# Patient Record
Sex: FEMALE | Race: WHITE | NOT HISPANIC OR LATINO | URBAN - METROPOLITAN AREA
[De-identification: names, ages, dates, MRNs, and addresses within clinical notes are randomized per-mention and may not be internally consistent; named-entity substitution may affect disease eponyms.]

---

## 2023-02-28 VITALS
RESPIRATION RATE: 16 BRPM | OXYGEN SATURATION: 98 % | SYSTOLIC BLOOD PRESSURE: 177 MMHG | WEIGHT: 161.6 LBS | HEART RATE: 77 BPM | TEMPERATURE: 98 F | DIASTOLIC BLOOD PRESSURE: 84 MMHG | HEIGHT: 67 IN

## 2023-02-28 RX ORDER — POVIDONE-IODINE 5 %
1 AEROSOL (ML) TOPICAL ONCE
Refills: 0 | Status: COMPLETED | OUTPATIENT
Start: 2023-03-01 | End: 2023-03-01

## 2023-02-28 NOTE — H&P ADULT - HISTORY OF PRESENT ILLNESS
68F with left knee pain x    Presents for elective left TKR.  68F with left knee pain x 40 years s/p left knee dislocation. Pain persists despite conservative measures including PT and injections. Denies numbness/tingling to BLE. Ambulates without assistance.   Denies history of a blood clot/seizures. Denies CP/SOB/N/V.     Presents for elective left TKR.

## 2023-02-28 NOTE — H&P ADULT - PROBLEM SELECTOR PLAN 1
Admit to Orthopedic Service for elective left TKR    Medically cleared and optimized for surgery by Dr. Perry

## 2023-02-28 NOTE — H&P ADULT - NSHPLABSRESULTS_GEN_ALL_CORE
Preop CBC, BMP, PT/INR, PTT within normal range and reviewed per medical clearance  Cr- .64  UA: WNL  Preop CXR within normal limits and reviewed per medical clearance   Preop EKG within normal limits and reviewed per medical clearance; SR@84bpm  3M: BRAVO  COVID: neg 2/25

## 2023-02-28 NOTE — H&P ADULT - NSHPPHYSICALEXAM_GEN_ALL_CORE
Gen: 68F NAD  MSK: Decreased left knee ROM secondary to pain      Rest of PE per MD clearance Gen: 68F NAD, Alert and oriented, NAD  MSK: Decreased left knee ROM secondary to pain  DP pulses palpable bilaterally. Skin wwp. Cap refill brisk.   SILT to BLE  EHL/FHL/TA/GS 5/5 bilaterally.     Rest of PE per MD clearance

## 2023-02-28 NOTE — ASU PREOP CHECKLIST - NS PREOP CHK CHLOROHEX WASH
Please let pt know her HgbA1c is creeping up slowly -- now 6.4. In addition to having her exercise and follow a low carb diet, I would like to see her again in 6 mos with labs prior.
Spoke with Umair Su to relay MD message below. She stays in [id number] [facility] and is unsure of travel plans as of yet but will arrange for follow up around June or so and work on making lifestyle changes noted below.
in ASU:

## 2023-03-01 ENCOUNTER — INPATIENT (INPATIENT)
Facility: HOSPITAL | Age: 68
LOS: 1 days | Discharge: HOME | DRG: 470 | End: 2023-03-03
Attending: ORTHOPAEDIC SURGERY | Admitting: ORTHOPAEDIC SURGERY
Payer: MEDICARE

## 2023-03-01 DIAGNOSIS — M17.12 UNILATERAL PRIMARY OSTEOARTHRITIS, LEFT KNEE: ICD-10-CM

## 2023-03-01 DIAGNOSIS — E78.5 HYPERLIPIDEMIA, UNSPECIFIED: ICD-10-CM

## 2023-03-01 PROCEDURE — 73560 X-RAY EXAM OF KNEE 1 OR 2: CPT | Mod: 26,LT

## 2023-03-01 DEVICE — CELLERATE SURGICAL POWDER RX 5GM: Type: IMPLANTABLE DEVICE | Site: LEFT | Status: FUNCTIONAL

## 2023-03-01 DEVICE — PATELLA VE 29MM: Type: IMPLANTABLE DEVICE | Site: LEFT | Status: FUNCTIONAL

## 2023-03-01 DEVICE — SURF ART PERSONA LT 6-9 CD 10MM: Type: IMPLANTABLE DEVICE | Site: LEFT | Status: FUNCTIONAL

## 2023-03-01 DEVICE — ZIMMER/NEXGEN SMOOTH PIN 3.2X75MM: Type: IMPLANTABLE DEVICE | Site: LEFT | Status: FUNCTIONAL

## 2023-03-01 DEVICE — TIB PSN NP STM 5 DEG SZ DL: Type: IMPLANTABLE DEVICE | Site: LEFT | Status: FUNCTIONAL

## 2023-03-01 DEVICE — ZIMMER/NEXGEN HEX HEAD SCREW 3.5MM: Type: IMPLANTABLE DEVICE | Site: LEFT | Status: FUNCTIONAL

## 2023-03-01 DEVICE — ZIMMER FEMALE HEX SCREW MAGNETIC 2.5MM X 25MM: Type: IMPLANTABLE DEVICE | Site: LEFT | Status: FUNCTIONAL

## 2023-03-01 DEVICE — IMPLANTABLE DEVICE: Type: IMPLANTABLE DEVICE | Site: LEFT | Status: FUNCTIONAL

## 2023-03-01 DEVICE — STEM EXT PERSONA 14MM PLUS 30M: Type: IMPLANTABLE DEVICE | Site: LEFT | Status: FUNCTIONAL

## 2023-03-01 DEVICE — FEM PERSONA PS CMT CCR STD SZ6 L: Type: IMPLANTABLE DEVICE | Site: LEFT | Status: FUNCTIONAL

## 2023-03-01 RX ORDER — OXYCODONE HYDROCHLORIDE 5 MG/1
5 TABLET ORAL EVERY 4 HOURS
Refills: 0 | Status: DISCONTINUED | OUTPATIENT
Start: 2023-03-01 | End: 2023-03-02

## 2023-03-01 RX ORDER — POLYETHYLENE GLYCOL 3350 17 G/17G
17 POWDER, FOR SOLUTION ORAL AT BEDTIME
Refills: 0 | Status: DISCONTINUED | OUTPATIENT
Start: 2023-03-01 | End: 2023-03-03

## 2023-03-01 RX ORDER — ONDANSETRON 8 MG/1
4 TABLET, FILM COATED ORAL EVERY 6 HOURS
Refills: 0 | Status: DISCONTINUED | OUTPATIENT
Start: 2023-03-01 | End: 2023-03-03

## 2023-03-01 RX ORDER — SENNA PLUS 8.6 MG/1
2 TABLET ORAL AT BEDTIME
Refills: 0 | Status: DISCONTINUED | OUTPATIENT
Start: 2023-03-01 | End: 2023-03-03

## 2023-03-01 RX ORDER — ASPIRIN/CALCIUM CARB/MAGNESIUM 324 MG
325 TABLET ORAL
Refills: 0 | Status: DISCONTINUED | OUTPATIENT
Start: 2023-03-01 | End: 2023-03-03

## 2023-03-01 RX ORDER — PANTOPRAZOLE SODIUM 20 MG/1
40 TABLET, DELAYED RELEASE ORAL
Refills: 0 | Status: DISCONTINUED | OUTPATIENT
Start: 2023-03-01 | End: 2023-03-03

## 2023-03-01 RX ORDER — ACETAMINOPHEN 500 MG
975 TABLET ORAL EVERY 8 HOURS
Refills: 0 | Status: DISCONTINUED | OUTPATIENT
Start: 2023-03-01 | End: 2023-03-03

## 2023-03-01 RX ORDER — CHLORHEXIDINE GLUCONATE 213 G/1000ML
1 SOLUTION TOPICAL ONCE
Refills: 0 | Status: COMPLETED | OUTPATIENT
Start: 2023-03-01 | End: 2023-03-01

## 2023-03-01 RX ORDER — SODIUM CHLORIDE 9 MG/ML
1000 INJECTION, SOLUTION INTRAVENOUS
Refills: 0 | Status: DISCONTINUED | OUTPATIENT
Start: 2023-03-01 | End: 2023-03-02

## 2023-03-01 RX ORDER — MORPHINE SULFATE 50 MG/1
2 CAPSULE, EXTENDED RELEASE ORAL EVERY 4 HOURS
Refills: 0 | Status: DISCONTINUED | OUTPATIENT
Start: 2023-03-01 | End: 2023-03-03

## 2023-03-01 RX ORDER — MAGNESIUM HYDROXIDE 400 MG/1
30 TABLET, CHEWABLE ORAL DAILY
Refills: 0 | Status: DISCONTINUED | OUTPATIENT
Start: 2023-03-01 | End: 2023-03-03

## 2023-03-01 RX ORDER — SIMVASTATIN 20 MG/1
20 TABLET, FILM COATED ORAL AT BEDTIME
Refills: 0 | Status: DISCONTINUED | OUTPATIENT
Start: 2023-03-01 | End: 2023-03-03

## 2023-03-01 RX ORDER — HYDROMORPHONE HYDROCHLORIDE 2 MG/ML
0.5 INJECTION INTRAMUSCULAR; INTRAVENOUS; SUBCUTANEOUS
Refills: 0 | Status: DISCONTINUED | OUTPATIENT
Start: 2023-03-01 | End: 2023-03-03

## 2023-03-01 RX ORDER — SIMVASTATIN 20 MG/1
1 TABLET, FILM COATED ORAL
Qty: 0 | Refills: 0 | DISCHARGE

## 2023-03-01 RX ORDER — OXYCODONE HYDROCHLORIDE 5 MG/1
10 TABLET ORAL EVERY 4 HOURS
Refills: 0 | Status: DISCONTINUED | OUTPATIENT
Start: 2023-03-01 | End: 2023-03-02

## 2023-03-01 RX ORDER — CEFAZOLIN SODIUM 1 G
2000 VIAL (EA) INJECTION EVERY 8 HOURS
Refills: 0 | Status: COMPLETED | OUTPATIENT
Start: 2023-03-01 | End: 2023-03-02

## 2023-03-01 RX ORDER — CELECOXIB 200 MG/1
200 CAPSULE ORAL EVERY 12 HOURS
Refills: 0 | Status: DISCONTINUED | OUTPATIENT
Start: 2023-03-02 | End: 2023-03-03

## 2023-03-01 RX ADMIN — Medication 1 APPLICATION(S): at 08:42

## 2023-03-01 RX ADMIN — ONDANSETRON 4 MILLIGRAM(S): 8 TABLET, FILM COATED ORAL at 18:03

## 2023-03-01 RX ADMIN — Medication 100 MILLIGRAM(S): at 18:49

## 2023-03-01 RX ADMIN — SENNA PLUS 2 TABLET(S): 8.6 TABLET ORAL at 23:19

## 2023-03-01 RX ADMIN — POLYETHYLENE GLYCOL 3350 17 GRAM(S): 17 POWDER, FOR SOLUTION ORAL at 23:19

## 2023-03-01 RX ADMIN — CHLORHEXIDINE GLUCONATE 1 APPLICATION(S): 213 SOLUTION TOPICAL at 08:42

## 2023-03-01 RX ADMIN — SIMVASTATIN 20 MILLIGRAM(S): 20 TABLET, FILM COATED ORAL at 23:20

## 2023-03-01 RX ADMIN — Medication 325 MILLIGRAM(S): at 19:55

## 2023-03-01 RX ADMIN — Medication 975 MILLIGRAM(S): at 23:20

## 2023-03-01 NOTE — PHYSICAL THERAPY INITIAL EVALUATION ADULT - GAIT DEVIATIONS NOTED, PT EVAL
Pt slightly unsteady w/ dec weight shifting abilities. Dec step length and nivia. Adequate BUE strength to maneuver RW w/o difficulties. Complained of lightheadedness w/ amb. Vitals WNL post amb. No acute distress noted./decreased nivia/increased time in double stance/decreased velocity of limb motion/decreased step length/decreased stride length/decreased weight-shifting ability

## 2023-03-01 NOTE — PHYSICAL THERAPY INITIAL EVALUATION ADULT - PERTINENT HX OF CURRENT PROBLEM, REHAB EVAL
68F with left knee pain x 40 years s/p left knee dislocation. Pain persists despite conservative measures including PT and injections. Denies numbness/tingling to BLE. Ambulates without assistance.   Denies history of a blood clot/seizures. Denies CP/SOB/N/V.     Presents for elective left TKR.

## 2023-03-01 NOTE — PHYSICAL THERAPY INITIAL EVALUATION ADULT - SIT-TO-STAND BALANCE
[Non-Contributory] : Non-contributory [Duration: ___ wks] : duration: [unfilled] weeks [] :  [___ lbs.] : [unfilled] lbs [Normal?] : normal delivery [___ oz.] : [unfilled] oz. [Was child in NICU?] : Child was not in NICU good minus

## 2023-03-01 NOTE — PHYSICAL THERAPY INITIAL EVALUATION ADULT - ADDITIONAL COMMENTS
Pt currently resides in elevator apt w/ , 8 PIER[address]. Primarily amb indep w/o AD. Denies falls within past 6 months. Indep w/ all ADL and iADL tasks prior to sx.

## 2023-03-01 NOTE — BRIEF OPERATIVE NOTE - OPERATION/FINDINGS
See dictation See dictation  Michelle Persona Size 6 PS Femur  Size D Tibia + 14/30 Stem  Size 10 Poly  Size 29 Patella

## 2023-03-01 NOTE — PHYSICAL THERAPY INITIAL EVALUATION ADULT - MODALITIES TREATMENT COMMENTS
show
Written HEP provided - supine quad sets, supine heel slides, supine ankle pumps, supine glute sets.

## 2023-03-02 DIAGNOSIS — Z98.890 OTHER SPECIFIED POSTPROCEDURAL STATES: ICD-10-CM

## 2023-03-02 LAB
ANION GAP SERPL CALC-SCNC: 7 MMOL/L — SIGNIFICANT CHANGE UP (ref 5–17)
BUN SERPL-MCNC: 9 MG/DL — SIGNIFICANT CHANGE UP (ref 7–23)
CALCIUM SERPL-MCNC: 9.5 MG/DL — SIGNIFICANT CHANGE UP (ref 8.4–10.5)
CHLORIDE SERPL-SCNC: 100 MMOL/L — SIGNIFICANT CHANGE UP (ref 96–108)
CO2 SERPL-SCNC: 26 MMOL/L — SIGNIFICANT CHANGE UP (ref 22–31)
CREAT SERPL-MCNC: 0.57 MG/DL — SIGNIFICANT CHANGE UP (ref 0.5–1.3)
EGFR: 98 ML/MIN/1.73M2 — SIGNIFICANT CHANGE UP
GLUCOSE SERPL-MCNC: 122 MG/DL — HIGH (ref 70–99)
HCT VFR BLD CALC: 31.6 % — LOW (ref 34.5–45)
HCV AB S/CO SERPL IA: 0.04 S/CO — SIGNIFICANT CHANGE UP
HCV AB SERPL-IMP: SIGNIFICANT CHANGE UP
HGB BLD-MCNC: 10.2 G/DL — LOW (ref 11.5–15.5)
MCHC RBC-ENTMCNC: 29.7 PG — SIGNIFICANT CHANGE UP (ref 27–34)
MCHC RBC-ENTMCNC: 32.3 GM/DL — SIGNIFICANT CHANGE UP (ref 32–36)
MCV RBC AUTO: 92.1 FL — SIGNIFICANT CHANGE UP (ref 80–100)
NRBC # BLD: 0 /100 WBCS — SIGNIFICANT CHANGE UP (ref 0–0)
PLATELET # BLD AUTO: 208 K/UL — SIGNIFICANT CHANGE UP (ref 150–400)
POTASSIUM SERPL-MCNC: 4.1 MMOL/L — SIGNIFICANT CHANGE UP (ref 3.5–5.3)
POTASSIUM SERPL-SCNC: 4.1 MMOL/L — SIGNIFICANT CHANGE UP (ref 3.5–5.3)
RBC # BLD: 3.43 M/UL — LOW (ref 3.8–5.2)
RBC # FLD: 12.8 % — SIGNIFICANT CHANGE UP (ref 10.3–14.5)
SODIUM SERPL-SCNC: 133 MMOL/L — LOW (ref 135–145)
WBC # BLD: 10.96 K/UL — HIGH (ref 3.8–10.5)
WBC # FLD AUTO: 10.96 K/UL — HIGH (ref 3.8–10.5)

## 2023-03-02 PROCEDURE — 99232 SBSQ HOSP IP/OBS MODERATE 35: CPT

## 2023-03-02 RX ORDER — OXYCODONE HYDROCHLORIDE 5 MG/1
5 TABLET ORAL EVERY 4 HOURS
Refills: 0 | Status: DISCONTINUED | OUTPATIENT
Start: 2023-03-02 | End: 2023-03-02

## 2023-03-02 RX ORDER — OXYCODONE HYDROCHLORIDE 5 MG/1
2.5 TABLET ORAL EVERY 4 HOURS
Refills: 0 | Status: DISCONTINUED | OUTPATIENT
Start: 2023-03-02 | End: 2023-03-02

## 2023-03-02 RX ORDER — OXYCODONE HYDROCHLORIDE 5 MG/1
10 TABLET ORAL EVERY 4 HOURS
Refills: 0 | Status: DISCONTINUED | OUTPATIENT
Start: 2023-03-02 | End: 2023-03-03

## 2023-03-02 RX ORDER — OXYCODONE HYDROCHLORIDE 5 MG/1
5 TABLET ORAL EVERY 4 HOURS
Refills: 0 | Status: DISCONTINUED | OUTPATIENT
Start: 2023-03-02 | End: 2023-03-03

## 2023-03-02 RX ADMIN — MORPHINE SULFATE 2 MILLIGRAM(S): 50 CAPSULE, EXTENDED RELEASE ORAL at 21:59

## 2023-03-02 RX ADMIN — Medication 325 MILLIGRAM(S): at 06:26

## 2023-03-02 RX ADMIN — PANTOPRAZOLE SODIUM 40 MILLIGRAM(S): 20 TABLET, DELAYED RELEASE ORAL at 06:26

## 2023-03-02 RX ADMIN — SENNA PLUS 2 TABLET(S): 8.6 TABLET ORAL at 21:59

## 2023-03-02 RX ADMIN — MORPHINE SULFATE 2 MILLIGRAM(S): 50 CAPSULE, EXTENDED RELEASE ORAL at 17:20

## 2023-03-02 RX ADMIN — Medication 100 MILLIGRAM(S): at 03:10

## 2023-03-02 RX ADMIN — Medication 975 MILLIGRAM(S): at 07:26

## 2023-03-02 RX ADMIN — OXYCODONE HYDROCHLORIDE 10 MILLIGRAM(S): 5 TABLET ORAL at 20:36

## 2023-03-02 RX ADMIN — CELECOXIB 200 MILLIGRAM(S): 200 CAPSULE ORAL at 17:20

## 2023-03-02 RX ADMIN — OXYCODONE HYDROCHLORIDE 10 MILLIGRAM(S): 5 TABLET ORAL at 16:11

## 2023-03-02 RX ADMIN — POLYETHYLENE GLYCOL 3350 17 GRAM(S): 17 POWDER, FOR SOLUTION ORAL at 21:59

## 2023-03-02 RX ADMIN — CELECOXIB 200 MILLIGRAM(S): 200 CAPSULE ORAL at 18:20

## 2023-03-02 RX ADMIN — Medication 975 MILLIGRAM(S): at 14:12

## 2023-03-02 RX ADMIN — CELECOXIB 200 MILLIGRAM(S): 200 CAPSULE ORAL at 07:26

## 2023-03-02 RX ADMIN — Medication 975 MILLIGRAM(S): at 22:59

## 2023-03-02 RX ADMIN — SIMVASTATIN 20 MILLIGRAM(S): 20 TABLET, FILM COATED ORAL at 21:59

## 2023-03-02 RX ADMIN — Medication 975 MILLIGRAM(S): at 06:26

## 2023-03-02 RX ADMIN — CELECOXIB 200 MILLIGRAM(S): 200 CAPSULE ORAL at 06:26

## 2023-03-02 RX ADMIN — Medication 975 MILLIGRAM(S): at 21:59

## 2023-03-02 RX ADMIN — MORPHINE SULFATE 2 MILLIGRAM(S): 50 CAPSULE, EXTENDED RELEASE ORAL at 22:59

## 2023-03-02 RX ADMIN — OXYCODONE HYDROCHLORIDE 5 MILLIGRAM(S): 5 TABLET ORAL at 12:43

## 2023-03-02 RX ADMIN — OXYCODONE HYDROCHLORIDE 10 MILLIGRAM(S): 5 TABLET ORAL at 21:36

## 2023-03-02 RX ADMIN — Medication 975 MILLIGRAM(S): at 13:12

## 2023-03-02 RX ADMIN — OXYCODONE HYDROCHLORIDE 5 MILLIGRAM(S): 5 TABLET ORAL at 11:43

## 2023-03-02 RX ADMIN — Medication 975 MILLIGRAM(S): at 00:02

## 2023-03-02 RX ADMIN — Medication 325 MILLIGRAM(S): at 17:21

## 2023-03-02 NOTE — DISCHARGE NOTE PROVIDER - HOSPITAL COURSE
Admitted: 3/1/23  Surgery: 3/1/23  Ara-op Antibiotics: Ancef  Pain control  DVT prophylaxis: SCDs, ASA 325mg BID  OOB/Physical Therapy  Consultants:  Inpatient events:   Admitted: 3/1/23  Surgery: 3/1/23  Ara-op Antibiotics: Ancef  Pain control  DVT prophylaxis: SCDs, ASA 325mg BID  OOB/Physical Therapy  Consultants: n/a  Inpatient events: n/a

## 2023-03-02 NOTE — DISCHARGE NOTE PROVIDER - NSDCFUADDINST_GEN_ALL_CORE_FT
ACTIVITY:  - Weightbear as tolerated with assistive device. No strenuous activity, heavy lifting, driving or returning to work until cleared by MD.  - You may experience postoperative swelling on the operative extremity. You may ice the surgery site for 20 minute intervals every hour.  - If you have had a knee replacement, do not place pillows or bolsters underneath the back of the knee. You may elevate the leg at the level of the ankle/heel.  _________  DRESSING: Aquacel (brown waxy dressing over knee incision); Gauze/teg (clear covering over gauze, below Aquacel)  - You may shower, your dressing is water-resistant. Do not soak in bathtubs. Remove dressing after postop day 10, then leave incision open to air.  - Keep your incision clean and dry. Do not pick at your incision. Do not apply creams, ointments or oils to your incision until cleared by your surgeon. Do not soak your incision in sitting water (ie tubs, pools, lakes, etc.) until cleared by your surgeon. You may let clean, running water fall over your incision.  - For any questions or concerns regarding incision or dressing care, call your surgeon's office.  _________  MEDICATION/ANTICOAGULATION:  - You have been prescribed Aspirin 325mg twice daily x 30 days as a preventative to help prevent postoperative blood clots. Please take this medication as prescribed.   - You have been prescribed medications for pain:    - Tylenol for mild to moderate pain. Do not exceed 3,000mg daily.    - Narcotic pain medication. For more severe pain, take Tylenol with the addition of narcotic pain medication. Take this medication as prescribed. This medication may cause drowsiness or dizziness. Do not operate machinery. This medication may cause constipation.  - Try to have regular bowel movements. Take stool softener or laxative if necessary. You may wish to take Miralax (purchased over the counter) daily until you have regular bowel movements.   - GI protection. Please take Pantoprazole once a day, before breakfast, until no longer taking Aspirin, blood thinner, or anti-inflammatory. This will help protect your stomach.  - For any additional medications, follow instructions on the bottle.   - If you have a pain management physician, please follow-up with them postoperatively.   - If you experience any negative side effects of your medications, please call your surgeon's office to discuss.  _________  Follow-up:  - Call to schedule an appt with Dr. Aguilar within 2 weeks of surgery for follow up. If you have staples or sutures they will be removed in office.  - Please follow-up with your primary care physician or any other specialist you see postoperatively, if needed.   - Contact your doctor if you experience: fever greater than 101.5, chills, chest pain, difficulty breathing, redness or excessive drainage around the incision, other concerns.   ACTIVITY:  - Weightbear as tolerated with assistive device. No strenuous activity, heavy lifting, driving or returning to work until cleared by MD.  - You may experience postoperative swelling on the operative extremity. You may ice the surgery site for 20 minute intervals every hour.  - If you have had a knee replacement, do not place pillows or bolsters underneath the back of the knee. You may elevate the leg at the level of the ankle/heel.  _________  DRESSING: Aquacel (brown waxy dressing over knee incision); Gauze/teg (clear covering over gauze, below Aquacel)  - You may shower, your dressing is water-resistant. Do not soak in bathtubs. Remove dressing after postop day 10, then leave incision open to air.  - Keep your incision clean and dry. Do not pick at your incision. Do not apply creams, ointments or oils to your incision until cleared by your surgeon. Do not soak your incision in sitting water (ie tubs, pools, lakes, etc.) until cleared by your surgeon. You may let clean, running water fall over your incision.  - For any questions or concerns regarding incision or dressing care, call your surgeon's office.  - Please wear the compression stockings until you follow up with your surgeon, you may remove them to shower.  _________  MEDICATION/ANTICOAGULATION:  - You have been prescribed Aspirin 325mg twice daily x 30 days as a preventative to help prevent postoperative blood clots. Please take this medication as prescribed.   - You have been prescribed medications for pain:    - Tylenol for mild to moderate pain. Do not exceed 3,000mg daily.    - Narcotic pain medication. For more severe pain, take Tylenol with the addition of narcotic pain medication. Take this medication as prescribed. This medication may cause drowsiness or dizziness. Do not operate machinery. This medication may cause constipation.  - Try to have regular bowel movements. Take stool softener or laxative if necessary. You may wish to take Miralax (purchased over the counter) daily until you have regular bowel movements.   - GI protection. Please take Pantoprazole once a day, before breakfast, until no longer taking Aspirin, blood thinner, or anti-inflammatory. This will help protect your stomach.  - For any additional medications, follow instructions on the bottle.   - If you have a pain management physician, please follow-up with them postoperatively.   - If you experience any negative side effects of your medications, please call your surgeon's office to discuss.  _________  Follow-up:  - Call to schedule an appt with Dr. Aguilar within 2 weeks of surgery for follow up. If you have staples or sutures they will be removed in office.  - Please follow-up with your primary care physician or any other specialist you see postoperatively, if needed.   - Your sodium was low at 132 this morning, you have been given a prescription to recheck your sodium outpatient, please take this to a lab within 1 week of discharge and follow up with your PCP to review results.  - Encourage you to eat regular meals and multiple snacks throughout the day   - Contact your doctor if you experience: fever greater than 101.5, chills, chest pain, difficulty breathing, redness or excessive drainage around the incision, other concerns.

## 2023-03-02 NOTE — PROGRESS NOTE ADULT - ASSESSMENT
A/P: 68yFemale s/p L TKA on 03/02  - Stable  - Pain/Nausea Control adequate  - Home meds  - AM labs pending 03/02  - DVT ppx:  BID  - WBS: WBAT LLE  - PT: rec'd HPT  - Dispo: pending PT clearance -- likely 03/02 vs 03/03      Ortho Pager [phone]

## 2023-03-02 NOTE — DISCHARGE NOTE PROVIDER - NSDCACTIVITY_GEN_ALL_CORE
Showering allowed/Stairs allowed/Walking - Indoors allowed/No heavy lifting/straining/Follow Instructions Provided by your Surgical Team

## 2023-03-02 NOTE — DISCHARGE NOTE PROVIDER - CARE PROVIDER_API CALL
Kan Aguilar)  Orthopaedic Surgery  [address] [place] [address]  Phone: [phone]  Fax: [phone]  Follow Up Time: 2 weeks

## 2023-03-02 NOTE — DISCHARGE NOTE PROVIDER - NSDCMRMEDTOKEN_GEN_ALL_CORE_FT
simvastatin 20 mg oral tablet: 1 tab(s) orally once a day (at bedtime)   acetaminophen 325 mg oral tablet: 3 tab(s) orally every 8 hours  aspirin 325 mg oral tablet: 1 tab(s) orally 2 times a day  oxyCODONE 5 mg oral tablet: 1-2 tab(s) orally every 4 hours, As Needed -for moderate to severe pain MDD:6   simvastatin 20 mg oral tablet: 1 tab(s) orally once a day (at bedtime)   acetaminophen 325 mg oral tablet: 3 tab(s) orally every 8 hours  aspirin 325 mg oral tablet: 1 tab(s) orally 2 times a day  BMP: BMP    Please fax to Dr. Perry- Fax- [phone]  pantoprazole 40 mg oral delayed release tablet: 1 tab(s) orally once a day (before a meal)  polyethylene glycol 3350 oral powder for reconstitution: 17 gram(s) orally once a day (at bedtime)  senna leaf extract oral tablet: 2 tab(s) orally once a day (at bedtime)  simvastatin 20 mg oral tablet: 1 tab(s) orally once a day (at bedtime)

## 2023-03-03 VITALS
DIASTOLIC BLOOD PRESSURE: 65 MMHG | HEART RATE: 72 BPM | RESPIRATION RATE: 16 BRPM | TEMPERATURE: 98 F | SYSTOLIC BLOOD PRESSURE: 103 MMHG | OXYGEN SATURATION: 95 %

## 2023-03-03 DIAGNOSIS — D62 ACUTE POSTHEMORRHAGIC ANEMIA: ICD-10-CM

## 2023-03-03 DIAGNOSIS — E87.1 HYPO-OSMOLALITY AND HYPONATREMIA: ICD-10-CM

## 2023-03-03 LAB
ANION GAP SERPL CALC-SCNC: 7 MMOL/L — SIGNIFICANT CHANGE UP (ref 5–17)
BUN SERPL-MCNC: 11 MG/DL — SIGNIFICANT CHANGE UP (ref 7–23)
CALCIUM SERPL-MCNC: 9.1 MG/DL — SIGNIFICANT CHANGE UP (ref 8.4–10.5)
CHLORIDE SERPL-SCNC: 96 MMOL/L — SIGNIFICANT CHANGE UP (ref 96–108)
CO2 SERPL-SCNC: 29 MMOL/L — SIGNIFICANT CHANGE UP (ref 22–31)
CREAT SERPL-MCNC: 0.65 MG/DL — SIGNIFICANT CHANGE UP (ref 0.5–1.3)
EGFR: 95 ML/MIN/1.73M2 — SIGNIFICANT CHANGE UP
GLUCOSE SERPL-MCNC: 106 MG/DL — HIGH (ref 70–99)
HCT VFR BLD CALC: 30.5 % — LOW (ref 34.5–45)
HGB BLD-MCNC: 10 G/DL — LOW (ref 11.5–15.5)
MCHC RBC-ENTMCNC: 29.9 PG — SIGNIFICANT CHANGE UP (ref 27–34)
MCHC RBC-ENTMCNC: 32.8 GM/DL — SIGNIFICANT CHANGE UP (ref 32–36)
MCV RBC AUTO: 91 FL — SIGNIFICANT CHANGE UP (ref 80–100)
NRBC # BLD: 0 /100 WBCS — SIGNIFICANT CHANGE UP (ref 0–0)
PLATELET # BLD AUTO: 193 K/UL — SIGNIFICANT CHANGE UP (ref 150–400)
POTASSIUM SERPL-MCNC: 4 MMOL/L — SIGNIFICANT CHANGE UP (ref 3.5–5.3)
POTASSIUM SERPL-SCNC: 4 MMOL/L — SIGNIFICANT CHANGE UP (ref 3.5–5.3)
RBC # BLD: 3.35 M/UL — LOW (ref 3.8–5.2)
RBC # FLD: 13.2 % — SIGNIFICANT CHANGE UP (ref 10.3–14.5)
SODIUM SERPL-SCNC: 132 MMOL/L — LOW (ref 135–145)
WBC # BLD: 7.42 K/UL — SIGNIFICANT CHANGE UP (ref 3.8–10.5)
WBC # FLD AUTO: 7.42 K/UL — SIGNIFICANT CHANGE UP (ref 3.8–10.5)

## 2023-03-03 PROCEDURE — 97161 PT EVAL LOW COMPLEX 20 MIN: CPT

## 2023-03-03 PROCEDURE — 85027 COMPLETE CBC AUTOMATED: CPT

## 2023-03-03 PROCEDURE — 99231 SBSQ HOSP IP/OBS SF/LOW 25: CPT

## 2023-03-03 PROCEDURE — 86803 HEPATITIS C AB TEST: CPT

## 2023-03-03 PROCEDURE — C1889: CPT

## 2023-03-03 PROCEDURE — 73560 X-RAY EXAM OF KNEE 1 OR 2: CPT

## 2023-03-03 PROCEDURE — 97116 GAIT TRAINING THERAPY: CPT

## 2023-03-03 PROCEDURE — C1713: CPT

## 2023-03-03 PROCEDURE — 80048 BASIC METABOLIC PNL TOTAL CA: CPT

## 2023-03-03 PROCEDURE — C1776: CPT

## 2023-03-03 PROCEDURE — 36415 COLL VENOUS BLD VENIPUNCTURE: CPT

## 2023-03-03 RX ORDER — OXYCODONE HYDROCHLORIDE 5 MG/1
1 TABLET ORAL
Qty: 0 | Refills: 0 | DISCHARGE
Start: 2023-03-03

## 2023-03-03 RX ORDER — PANTOPRAZOLE SODIUM 20 MG/1
1 TABLET, DELAYED RELEASE ORAL
Qty: 30 | Refills: 0
Start: 2023-03-03 | End: 2023-04-01

## 2023-03-03 RX ORDER — POLYETHYLENE GLYCOL 3350 17 G/17G
17 POWDER, FOR SOLUTION ORAL
Qty: 0 | Refills: 0 | DISCHARGE
Start: 2023-03-03

## 2023-03-03 RX ORDER — ASPIRIN/CALCIUM CARB/MAGNESIUM 324 MG
1 TABLET ORAL
Qty: 60 | Refills: 0
Start: 2023-03-03 | End: 2023-04-01

## 2023-03-03 RX ORDER — OXYCODONE HYDROCHLORIDE 5 MG/1
1 TABLET ORAL
Qty: 30 | Refills: 0
Start: 2023-03-03 | End: 2023-03-07

## 2023-03-03 RX ORDER — SENNA PLUS 8.6 MG/1
2 TABLET ORAL
Qty: 0 | Refills: 0 | DISCHARGE
Start: 2023-03-03

## 2023-03-03 RX ORDER — ACETAMINOPHEN 500 MG
3 TABLET ORAL
Qty: 0 | Refills: 0 | DISCHARGE
Start: 2023-03-03

## 2023-03-03 RX ADMIN — Medication 975 MILLIGRAM(S): at 05:58

## 2023-03-03 RX ADMIN — Medication 975 MILLIGRAM(S): at 13:47

## 2023-03-03 RX ADMIN — CELECOXIB 200 MILLIGRAM(S): 200 CAPSULE ORAL at 06:59

## 2023-03-03 RX ADMIN — OXYCODONE HYDROCHLORIDE 10 MILLIGRAM(S): 5 TABLET ORAL at 02:34

## 2023-03-03 RX ADMIN — Medication 975 MILLIGRAM(S): at 06:59

## 2023-03-03 RX ADMIN — OXYCODONE HYDROCHLORIDE 10 MILLIGRAM(S): 5 TABLET ORAL at 15:55

## 2023-03-03 RX ADMIN — OXYCODONE HYDROCHLORIDE 10 MILLIGRAM(S): 5 TABLET ORAL at 08:09

## 2023-03-03 RX ADMIN — Medication 325 MILLIGRAM(S): at 05:59

## 2023-03-03 RX ADMIN — OXYCODONE HYDROCHLORIDE 10 MILLIGRAM(S): 5 TABLET ORAL at 07:09

## 2023-03-03 RX ADMIN — OXYCODONE HYDROCHLORIDE 10 MILLIGRAM(S): 5 TABLET ORAL at 03:34

## 2023-03-03 RX ADMIN — CELECOXIB 200 MILLIGRAM(S): 200 CAPSULE ORAL at 05:58

## 2023-03-03 RX ADMIN — Medication 975 MILLIGRAM(S): at 14:04

## 2023-03-03 RX ADMIN — PANTOPRAZOLE SODIUM 40 MILLIGRAM(S): 20 TABLET, DELAYED RELEASE ORAL at 05:58

## 2023-03-03 RX ADMIN — OXYCODONE HYDROCHLORIDE 10 MILLIGRAM(S): 5 TABLET ORAL at 14:55

## 2023-03-03 NOTE — PROGRESS NOTE ADULT - ASSESSMENT
A/P: 68yFemale s/p L TKA on 03/02  - Stable  - Pain/Nausea Control adequate  - Home meds  - AM labs pending 03/03 -- Stable 03/03  - DVT ppx:  BID  - WBS: WBAT LLE  - PT: rec'd HPT  - Dispo: pending PT clearance -- likely 03/03      Ortho Pager [phone]

## 2023-03-03 NOTE — DISCHARGE NOTE NURSING/CASE MANAGEMENT/SOCIAL WORK - PATIENT PORTAL LINK FT
You can access the FollowMyHealth Patient Portal offered by [facility] by registering at the following website: [email or web address]. By joining [facility]’s FollowMyHealth portal, you will also be able to view your health information using other applications (apps) compatible with our system.

## 2023-03-03 NOTE — PROGRESS NOTE ADULT - SUBJECTIVE AND OBJECTIVE BOX
Ortho Note    Pt comfortable without complaints, pain controlled at time of encounter  Denies CP, SOB, N/V, numbness/tingling   One instance of poorly controlled pain 03/02 which resolved w morphine x1  Otherwise doing well    Vital Signs Last 24 Hrs  T(C): 36.7 (03 Mar 2023 05:07), Max: 36.8 (02 Mar 2023 09:01)  T(F): 98 (03 Mar 2023 05:07), Max: 98.3 (02 Mar 2023 09:01)  HR: 72 (03 Mar 2023 05:07) (72 - 83)  BP: 103/65 (03 Mar 2023 05:07) (94/57 - 123/76)  BP(mean): 69 (02 Mar 2023 09:01) (69 - 69)  RR: 16 (03 Mar 2023 05:07) (16 - 18)  SpO2: 95% (03 Mar 2023 05:07) (95% - 97%)    Parameters below as of 03 Mar 2023 05:07  Patient On (Oxygen Delivery Method): room air      VSS  General: A&Ox3, NAD  LLE: Aquacell DSG C/D/I  Pulses: Foot WWP; DP pulse 2+; Cap refill < 2 sec  Sensation: SILT distally and symmetric to contralateral extremity  Motor: TA/EHL/FHL/GS 5/5 and symmetric to contralateral extremity      Labs:                        10.2   10.96 )-----------( 208      ( 02 Mar 2023 05:30 )             31.6       03-02    133<L>  |  100  |  9   ----------------------------<  122<H>  4.1   |  26  |  0.57    Ca    9.5      02 Mar 2023 05:30                                            
Ortho Note    Pt comfortable without complaints, pain controlled. Reports improved "foggy" feeling from postop check yesterday.  Denies CP, SOB, N/V, new numbness/tingling. Denies dizziness or lightheaded feeling.  Tolerating PO intake, voiding without complication.     Vital Signs Last 24 Hrs  T(C): 36.8 (03-02-23 @ 09:01), Max: 36.8 (03-02-23 @ 09:01)  T(F): 98.3 (03-02-23 @ 09:01), Max: 98.3 (03-02-23 @ 09:01)  HR: 73 (03-02-23 @ 09:01) (73 - 73)  BP: 94/57 (03-02-23 @ 09:01) (94/57 - 94/57)  BP(mean): 69 (03-02-23 @ 09:01) (69 - 69)  RR: 16 (03-02-23 @ 09:01) (16 - 16)  SpO2: 97% (03-02-23 @ 09:01) (97% - 97%)  I&O's Summary    01 Mar 2023 07:01  -  02 Mar 2023 07:00  --------------------------------------------------------  IN: 720 mL / OUT: 0 mL / NET: 720 mL        Pt Alert and oriented, NAD  DSG C/D/I- Aquacel; dhara  Pulses: DP2+ bilat LE  Sensation: General sensation to light touch intact bilat LE  Motor:   EHL/FHL/TA/GS 5/5 bilat LE                          10.2   10.96 )-----------( 208      ( 02 Mar 2023 05:30 )             31.6     03-02    133<L>  |  100  |  9   ----------------------------<  122<H>  4.1   |  26  |  0.57    Ca    9.5      02 Mar 2023 05:30        A/P: 68yFemale s/p left TKA, 3/1/23, Dr. Aguilar  - Stable. Monitor BP  - Pain Control. Patient reluctant to take narcotics, will change oxy 2.5 for moderate and 5 for severe. If patient pain uncontrolled, increase dose  - DVT ppx: SCDs, ASA 325mg BID  - PT, WBS: WBAT  - Dispo: home pending PT clear      Ortho Pager [phone]
Ortho Post Op Check    Procedure: Left TKA  Surgeon: Lauren    Pt comfortable without complaints, pain controlled.  Denies CP, SOB, N/V, numbness/tingling. Reports feeling a little foggy, denies dizziness    Vital Signs Last 24 Hrs  T(C): 36.3 (03-01-23 @ 14:07), Max: 36.3 (03-01-23 @ 14:07)  T(F): 97.4 (03-01-23 @ 14:07), Max: 97.4 (03-01-23 @ 14:07)  HR: 72 (03-01-23 @ 15:22) (66 - 74)  BP: 125/56 (03-01-23 @ 15:22) (111/55 - 125/58)  BP(mean): 77 (03-01-23 @ 14:52) (77 - 84)  RR: 21 (03-01-23 @ 15:22) (6 - 36)  SpO2: 98% (03-01-23 @ 15:22) (97% - 99%)  I&O's Summary    01 Mar 2023 07:01  -  01 Mar 2023 16:17  --------------------------------------------------------  IN: 300 mL / OUT: 0 mL / NET: 300 mL        General: Pt Alert and oriented, NAD  DSG C/D/I: Aquacel  Pulses: DP2+ bilat LE  Sensation: General sensation to light touch intact bilat LE  Motor: EHL/FHL/TA/GS 5/5 bilat LE    Post-op X-Ray: Well placed hardware without hardware or fracture complication    A/P: 68yFemale s/p left TKR, 3/1/23, Dr. Aguilar  - Stable  - Pain Control  - DVT ppx: SCDs, ASA 325mg BID  - Post op abx: Ancef  - PT, WBS: WBAT    Ortho Pager [phone]
Ortho Note    Pt comfortable without complaints, pain controlled  Denies CP, SOB, N/V, numbness/tingling   Ambulated well w PT postop    Vital Signs Last 24 Hrs  T(C): 36.4 (02 Mar 2023 06:01), Max: 36.8 (02 Mar 2023 00:17)  T(F): 97.6 (02 Mar 2023 06:01), Max: 98.2 (02 Mar 2023 00:17)  HR: 63 (02 Mar 2023 06:01) (63 - 80)  BP: 100/65 (02 Mar 2023 06:01) (100/65 - 177/84)  BP(mean): 77 (01 Mar 2023 14:52) (77 - 84)  RR: 15 (02 Mar 2023 06:01) (6 - 36)  SpO2: 96% (02 Mar 2023 06:01) (95% - 99%)    Parameters below as of 02 Mar 2023 06:01  Patient On (Oxygen Delivery Method): room air          VSS  General: A&Ox3, NAD  LLE: Aquacell DSG C/D/I  Pulses: Foot WWP; DP pulse 2+; Cap refill < 2 sec  Sensation: SILT distally and symmetric to contralateral extremity  Motor: TA/EHL/FHL/GS 5/5 and symmetric to contralateral extremity      Labs:                                
Interval Events: Reviewed  Patient seen and examined at bedside.    Patient is a 68y old  Female who presents with a chief complaint of Left knee pain (02 Mar 2023 14:56)    she is doing well and the pain is controlled  PAST MEDICAL & SURGICAL HISTORY:  HLD (hyperlipidemia)          MEDICATIONS:  Pulmonary:    Antimicrobials:    Anticoagulants:    Cardiac:      Allergies    No Known Allergies    Intolerances        Vital Signs Last 24 Hrs  T(C): 36.8 (02 Mar 2023 21:01), Max: 36.8 (02 Mar 2023 00:17)  T(F): 98.3 (02 Mar 2023 21:01), Max: 98.3 (02 Mar 2023 09:01)  HR: 83 (02 Mar 2023 21:01) (63 - 83)  BP: 123/76 (02 Mar 2023 21:01) (94/57 - 123/76)  BP(mean): 69 (02 Mar 2023 09:01) (69 - 69)  RR: 18 (02 Mar 2023 21:01) (15 - 18)  SpO2: 97% (02 Mar 2023 21:01) (96% - 97%)    Parameters below as of 02 Mar 2023 21:01  Patient On (Oxygen Delivery Method): room air        03-01 @ 07:01  -  03-02 @ 07:00  --------------------------------------------------------  IN: 720 mL / OUT: 0 mL / NET: 720 mL          Review of Systems:   •	General: negative  •	Skin/Breast: negative  •	Ophthalmologic: negative  •	ENMT: negative  •	Respiratory and Thorax: negative  •	Cardiovascular: negative  •	Gastrointestinal: negative  •	Genitourinary: negative  •	Musculoskeletal: negative  •	Neurological: negative  •	Psychiatric: negative  •	Hematology/Lymphatics: negative  •	Endocrine: negative  •	Allergic/Immunologic: negative    Physical Exam:   • Constitutional:	refer to the dietion /Nutritionist note  • Eyes:	EOMI; PERRL; no drainage or redness  • ENMT:	No oral lesions; no gross abnormalities  • Neck	No bruits; no thyromegaly or nodules  • Breasts:	not examined  • Back:	No deformity or limitation of movement  • Respiratory:	Breath Sounds equal & clear to percussion & auscultation, no accessory muscle use  • Cardiovascular:	Regular rate & rhythm, normal S1, S2; no murmurs, gallops or rubs; no S3, S4  • Gastrointestinal:	Soft, non-tender, no hepatosplenomegaly, normal bowel sounds  • Genitourinary:	not examined  • Rectal: not examined  • Extremities:	No cyanosis, clubbing or edema  • Vascular:	Equal and normal pulses (carotid, femoral, dorsalis pedis)  • Neurologica:l	not examined  • Skin:	No lesions; no rash  • Lymph Nodes:	No lymphadedenopathy  • Musculoskeletal:	No joint pain, swelling or deformity; no limitation of movement        LABS:      CBC Full  -  ( 02 Mar 2023 05:30 )  WBC Count : 10.96 K/uL  RBC Count : 3.43 M/uL  Hemoglobin : 10.2 g/dL  Hematocrit : 31.6 %  Platelet Count - Automated : 208 K/uL  Mean Cell Volume : 92.1 fl  Mean Cell Hemoglobin : 29.7 pg  Mean Cell Hemoglobin Concentration : 32.3 gm/dL  Auto Neutrophil # : x  Auto Lymphocyte # : x  Auto Monocyte # : x  Auto Eosinophil # : x  Auto Basophil # : x  Auto Neutrophil % : x  Auto Lymphocyte % : x  Auto Monocyte % : x  Auto Eosinophil % : x  Auto Basophil % : x    03-02    133<L>  |  100  |  9   ----------------------------<  122<H>  4.1   |  26  |  0.57    Ca    9.5      02 Mar 2023 05:30                          RADIOLOGY & ADDITIONAL STUDIES (The following images were personally reviewed):  
Interval Events: Reviewed  Patient seen and examined at bedside.    Patient is a 68y old  Female who presents with a chief complaint of Left knee pain (03 Mar 2023 06:39)    he is doing well and pain in joint is controlled  PAST MEDICAL & SURGICAL HISTORY:  HLD (hyperlipidemia)          MEDICATIONS:  Pulmonary:    Antimicrobials:    Anticoagulants:    Cardiac:      Allergies    No Known Allergies    Intolerances        Vital Signs Last 24 Hrs  T(C): 36.7 (03 Mar 2023 05:07), Max: 36.8 (02 Mar 2023 21:01)  T(F): 98 (03 Mar 2023 05:07), Max: 98.3 (02 Mar 2023 21:01)  HR: 72 (03 Mar 2023 05:07) (72 - 83)  BP: 103/65 (03 Mar 2023 05:07) (103/65 - 123/76)  BP(mean): --  RR: 16 (03 Mar 2023 05:07) (16 - 18)  SpO2: 95% (03 Mar 2023 05:07) (95% - 97%)    Parameters below as of 03 Mar 2023 05:07  Patient On (Oxygen Delivery Method): room air        03-03 @ 07:01  -  03-03 @ 11:04  --------------------------------------------------------  IN: 120 mL / OUT: 0 mL / NET: 120 mL          Review of Systems:   •	General: negative  •	Skin/Breast: negative  •	Ophthalmologic: negative  •	ENMT: negative  •	Respiratory and Thorax: negative  •	Cardiovascular: negative  •	Gastrointestinal: negative  •	Genitourinary: negative  •	Musculoskeletal: negative  •	Neurological: negative  •	Psychiatric: negative  •	Hematology/Lymphatics: negative  •	Endocrine: negative  •	Allergic/Immunologic: negative    Physical Exam:   • Constitutional:	refer to the dietion /Nutritionist note  • Eyes:	EOMI; PERRL; no drainage or redness  • ENMT:	No oral lesions; no gross abnormalities  • Neck	No bruits; no thyromegaly or nodules  • Breasts:	not examined  • Back:	No deformity or limitation of movement  • Respiratory:	Breath Sounds equal & clear to percussion & auscultation, no accessory muscle use  • Cardiovascular:	Regular rate & rhythm, normal S1, S2; no murmurs, gallops or rubs; no S3, S4  • Gastrointestinal:	Soft, non-tender, no hepatosplenomegaly, normal bowel sounds  • Genitourinary:	not examined  • Rectal: not examined  • Extremities:	No cyanosis, clubbing or edema  • Vascular:	Equal and normal pulses (carotid, femoral, dorsalis pedis)  • Neurologica:l	not examined  • Skin:	No lesions; no rash  • Lymph Nodes:	No lymphadedenopathy  • Musculoskeletal:	No joint pain, swelling or deformity; no limitation of movement        LABS:      CBC Full  -  ( 03 Mar 2023 07:39 )  WBC Count : 7.42 K/uL  RBC Count : 3.35 M/uL  Hemoglobin : 10.0 g/dL  Hematocrit : 30.5 %  Platelet Count - Automated : 193 K/uL  Mean Cell Volume : 91.0 fl  Mean Cell Hemoglobin : 29.9 pg  Mean Cell Hemoglobin Concentration : 32.8 gm/dL  Auto Neutrophil # : x  Auto Lymphocyte # : x  Auto Monocyte # : x  Auto Eosinophil # : x  Auto Basophil # : x  Auto Neutrophil % : x  Auto Lymphocyte % : x  Auto Monocyte % : x  Auto Eosinophil % : x  Auto Basophil % : x    03-03    132<L>  |  96  |  11  ----------------------------<  106<H>  4.0   |  29  |  0.65    Ca    9.1      03 Mar 2023 07:39                          RADIOLOGY & ADDITIONAL STUDIES (The following images were personally reviewed):

## 2023-03-03 NOTE — PROGRESS NOTE ADULT - PROBLEM SELECTOR PLAN 3
Monitor hemoglobin. Hold transfusion unless hemoglobin is 7, 8 in patient with coronary artery disease, hemodynamic instability such as tachycardia/hypotension, or there is evidence of acute blood loss.  Anemia is due to postoperative blood loss
not on statin

## 2023-03-03 NOTE — PROGRESS NOTE ADULT - PROBLEM SELECTOR PLAN 1
The pain is controlled.  The patient participated in physical therapy and did well and was cleared by physical therapy
The pain is controlled.  The patient participated in physical therapy and did well and was cleared by physical therapy

## 2023-03-03 NOTE — DISCHARGE NOTE NURSING/CASE MANAGEMENT/SOCIAL WORK - NSDCPEFALRISK_GEN_ALL_CORE
For information on Fall & Injury Prevention, visit: [email or web address] OR  [email or web address] OR  https://www.cdc.gov/steadi/patient.html

## 2023-03-03 NOTE — PATIENT PROFILE ADULT - FALL HARM RISK - UNIVERSAL INTERVENTIONS
Problem: Risk for Impaired Skin Integrity  Goal: Tissue integrity - skin and mucous membranes  Structural intactness and normal physiological function of skin and  mucous membranes. Outcome: Ongoing  No obvious new skin issues at this time. Problem: Falls - Risk of  Goal: Absence of falls  Outcome: Ongoing  No falls or injuries so far this shift. Call light in place. Bed in lowest position, wheels locked, appropriate side rails in place/Call bell, personal items and telephone in reach/Instruct patient to call for assistance before getting out of bed or chair/Non-slip footwear when patient is out of bed/[place] to call system/Physically safe environment - no spills, clutter or unnecessary equipment/Purposeful Proactive Rounding/Room/bathroom lighting operational, light cord in reach

## 2023-03-07 DIAGNOSIS — E87.1 HYPO-OSMOLALITY AND HYPONATREMIA: ICD-10-CM

## 2023-03-07 DIAGNOSIS — M17.12 UNILATERAL PRIMARY OSTEOARTHRITIS, LEFT KNEE: ICD-10-CM

## 2023-03-07 DIAGNOSIS — D62 ACUTE POSTHEMORRHAGIC ANEMIA: ICD-10-CM

## 2023-03-07 DIAGNOSIS — E78.5 HYPERLIPIDEMIA, UNSPECIFIED: ICD-10-CM

## (undated) DEVICE — Device

## (undated) DEVICE — SUT STRATAFIX SYMMETRIC PDS 1 45CM OS-6

## (undated) DEVICE — SAW BLADE STRYKER SAGITTAL 25X86.5X1.32MM

## (undated) DEVICE — DRAPE U POLY BLUE 60X72"

## (undated) DEVICE — PREP CHLORAPREP HI-LITE ORANGE 26ML

## (undated) DEVICE — DRSG ACE BANDAGE 6"

## (undated) DEVICE — SUT STRATAFIX SPIRAL PDO 0 24CM CP-2

## (undated) DEVICE — NDL 18G BLUNT FILL PINK

## (undated) DEVICE — DRAPE 1/2 SHEET 40X57"

## (undated) DEVICE — SAW BLADE STRYKER SAGITTAL 81.5X12.5X1.19MM

## (undated) DEVICE — WARMING BLANKET UPPER ADULT

## (undated) DEVICE — HOOD T5 PEELAWAY

## (undated) DEVICE — STAPLER SKIN PROXIMATE

## (undated) DEVICE — SURGIPHOR STERILE WOUND IRR POVIDONE IODINE

## (undated) DEVICE — SUT ETHILON 3-0 18" PS-1

## (undated) DEVICE — MARKING PEN W RULER

## (undated) DEVICE — STRYKER 4-PORT MANIFOLD W/SPECIMEN COLLECTION

## (undated) DEVICE — SAW BLADE STRYKER SAGITTAL DUAL CUT TEETH 35X64X.64MM

## (undated) DEVICE — DRSG COBAN 6"

## (undated) DEVICE — VENODYNE/SCD SLEEVE CALF MEDIUM

## (undated) DEVICE — SUT VICRYL PLUS 2-0 27" CT-1 UNDYED

## (undated) DEVICE — SUT VICRYL 2-0 27" CT-1 UNDYED

## (undated) DEVICE — DRSG AQUACEL 3.5 X 10"

## (undated) DEVICE — SUT VICRYL 0 36" CT-1 UNDYED

## (undated) DEVICE — SUT ETHIBOND 1 30" OS6

## (undated) DEVICE — PACK TOTAL KNEE

## (undated) DEVICE — ELCTR BOVIE PENCIL BLADE 10FT

## (undated) DEVICE — SUT STRATAFIX SPIRAL MONOCRYL PLUS 3-0 30CM PS-1 UNDYED

## (undated) DEVICE — POSITIONER FOAM EGG CRATE ULNAR 2PCS (PINK)

## (undated) DEVICE — SUT STRATAFIX SPIRAL PDO 1 30CM OS-6

## (undated) DEVICE — SYR LUER LOK 50CC

## (undated) DEVICE — DRSG WEBRIL 6"

## (undated) DEVICE — DRAPE TOWEL BLUE 17" X 24"

## (undated) DEVICE — GLV 7.5 PROTEXIS (WHITE)

## (undated) DEVICE — DRAPE LIGHT HANDLE COVER (BLUE)

## (undated) DEVICE — DRSG STOCKINETTE IMPERVIOUS XL

## (undated) DEVICE — SUCTION YANKAUER NO CONTROL VENT

## (undated) DEVICE — SYR ASEPTO